# Patient Record
Sex: MALE | Race: WHITE | NOT HISPANIC OR LATINO | Employment: OTHER | URBAN - NONMETROPOLITAN AREA
[De-identification: names, ages, dates, MRNs, and addresses within clinical notes are randomized per-mention and may not be internally consistent; named-entity substitution may affect disease eponyms.]

---

## 2020-06-22 ENCOUNTER — HOSPITAL ENCOUNTER (EMERGENCY)
Facility: HOSPITAL | Age: 40
Discharge: HOME | End: 2020-06-22
Attending: EMERGENCY MEDICINE | Admitting: EMERGENCY MEDICINE
Payer: COMMERCIAL

## 2020-06-22 VITALS
BODY MASS INDEX: 23.64 KG/M2 | WEIGHT: 156 LBS | OXYGEN SATURATION: 98 % | DIASTOLIC BLOOD PRESSURE: 74 MMHG | TEMPERATURE: 98 F | HEIGHT: 68 IN | HEART RATE: 61 BPM | RESPIRATION RATE: 18 BRPM | SYSTOLIC BLOOD PRESSURE: 126 MMHG

## 2020-06-22 DIAGNOSIS — T15.92XA FOREIGN BODY, EYE, LEFT, INITIAL ENCOUNTER: Primary | ICD-10-CM

## 2020-06-22 PROCEDURE — 99283 EMERGENCY DEPT VISIT LOW MDM: CPT

## 2020-06-22 PROCEDURE — 99283 EMERGENCY DEPT VISIT LOW MDM: CPT | Performed by: EMERGENCY MEDICINE

## 2020-06-22 RX ORDER — TETRACAINE HYDROCHLORIDE 5 MG/ML
2 SOLUTION OPHTHALMIC ONCE
Status: COMPLETED | OUTPATIENT
Start: 2020-06-22 | End: 2020-06-22

## 2020-06-22 RX ORDER — ERYTHROMYCIN 5 MG/G
0.5 OINTMENT OPHTHALMIC ONCE
Status: COMPLETED | OUTPATIENT
Start: 2020-06-22 | End: 2020-06-22

## 2020-06-22 RX ADMIN — FLUORESCEIN SODIUM 1 STRIP: 1 STRIP OPHTHALMIC at 21:49

## 2020-06-22 RX ADMIN — TETRACAINE HYDROCHLORIDE 2 DROP: 5 SOLUTION OPHTHALMIC at 21:50

## 2020-06-22 RX ADMIN — ERYTHROMYCIN 0.5 INCH: 5 OINTMENT OPHTHALMIC at 22:23

## 2022-03-27 ENCOUNTER — HOSPITAL ENCOUNTER (EMERGENCY)
Facility: HOSPITAL | Age: 42
Discharge: HOME | End: 2022-03-27
Attending: EMERGENCY MEDICINE | Admitting: EMERGENCY MEDICINE
Payer: COMMERCIAL

## 2022-03-27 ENCOUNTER — APPOINTMENT (EMERGENCY)
Dept: RADIOLOGY | Facility: HOSPITAL | Age: 42
End: 2022-03-27
Payer: COMMERCIAL

## 2022-03-27 VITALS
DIASTOLIC BLOOD PRESSURE: 89 MMHG | RESPIRATION RATE: 20 BRPM | WEIGHT: 153 LBS | TEMPERATURE: 97.9 F | HEART RATE: 64 BPM | OXYGEN SATURATION: 98 % | HEIGHT: 69 IN | SYSTOLIC BLOOD PRESSURE: 142 MMHG | BODY MASS INDEX: 22.66 KG/M2

## 2022-03-27 DIAGNOSIS — S90.30XA CONTUSION OF FOOT: Primary | ICD-10-CM

## 2022-03-27 DIAGNOSIS — H92.09 EAR PAIN: ICD-10-CM

## 2022-03-27 PROCEDURE — 99284 EMERGENCY DEPT VISIT MOD MDM: CPT | Performed by: PHYSICIAN ASSISTANT

## 2022-03-27 PROCEDURE — 99283 EMERGENCY DEPT VISIT LOW MDM: CPT

## 2022-03-27 PROCEDURE — 73630 X-RAY EXAM OF FOOT: CPT

## 2022-03-27 RX ORDER — IBUPROFEN 400 MG/1
800 TABLET ORAL ONCE
Status: COMPLETED | OUTPATIENT
Start: 2022-03-27 | End: 2022-03-27

## 2022-03-27 RX ORDER — ACETAMINOPHEN 325 MG/1
650 TABLET ORAL ONCE
Status: COMPLETED | OUTPATIENT
Start: 2022-03-27 | End: 2022-03-27

## 2022-03-27 RX ORDER — OFLOXACIN 3 MG/ML
1 SOLUTION/ DROPS OPHTHALMIC ONCE
Status: COMPLETED | OUTPATIENT
Start: 2022-03-27 | End: 2022-03-27

## 2022-03-27 RX ADMIN — IBUPROFEN 800 MG: 400 TABLET ORAL at 20:34

## 2022-03-27 RX ADMIN — ACETAMINOPHEN 325MG 650 MG: 325 TABLET ORAL at 19:33

## 2022-03-27 RX ADMIN — OFLOXACIN 1 DROP: 3 SOLUTION/ DROPS OPHTHALMIC at 19:33

## 2022-03-27 NOTE — ED PROVIDER NOTES
History  Chief Complaint   Patient presents with    Foot Injury     Pt reports going off a diving board and fell hitting bottom of R foot and smacking R side of face off the water  Pt reports pain in both R foot and ear pain/headache  Denies any LOC/vomiting    Head Injury     42year old male presents emergency department for evaluation  Patient states prior to arrival he was at a swimming pool edge of min off a diving board  States when he jumped he hit his foot off the diving board and landed on his right side hitting the side of his face off the water  He states since he has pain in the right foot and right ear  States he did continue to swim for some time after this incident  Denies any LOC, N/V, HA, dizziness, confusion or visual changes  Denies any drainage from the ear  Denies cough or congestion  Reports pain in the right foot with ambulation but improves with rest        History provided by:  Patient  Foot Injury - Major  Location:  Foot  Foot location:  R foot  Pain details:     Quality:  Unable to specify    Radiates to:  Does not radiate    Severity:  Mild    Onset quality:  Sudden  Chronicity:  New  Relieved by:  Rest  Worsened by:  Bearing weight  Ineffective treatments:  None tried  Associated symptoms: no back pain, no decreased ROM, no fatigue, no fever, no itching, no muscle weakness, no neck pain, no numbness, no stiffness, no swelling and no tingling    Earache  Location:  Right  Behind ear:  No abnormality  Quality:  Aching  Severity:  Mild  Onset quality:  Sudden  Timing:  Constant  Progression:  Worsening  Chronicity:  New  Context: direct blow    Relieved by:  Nothing  Worsened by:  Nothing  Ineffective treatments:  None tried  Associated symptoms: no abdominal pain, no congestion, no cough, no diarrhea, no ear discharge, no fever, no headaches, no hearing loss, no neck pain, no rash, no rhinorrhea, no sore throat, no tinnitus and no vomiting        None       History reviewed   No pertinent past medical history  Past Surgical History:   Procedure Laterality Date    FACIAL FRACTURE SURGERY      FOOT SURGERY         History reviewed  No pertinent family history  I have reviewed and agree with the history as documented  E-Cigarette/Vaping     E-Cigarette/Vaping Substances     Social History     Tobacco Use    Smoking status: Never Smoker    Smokeless tobacco: Never Used   Substance Use Topics    Alcohol use: Never    Drug use: Never       Review of Systems   Constitutional: Negative  Negative for appetite change, chills, diaphoresis, fatigue and fever  HENT: Positive for ear pain  Negative for congestion, ear discharge, hearing loss, rhinorrhea, sore throat and tinnitus  Respiratory: Negative  Negative for cough  Cardiovascular: Negative  Gastrointestinal: Negative  Negative for abdominal pain, diarrhea and vomiting  Musculoskeletal: Positive for arthralgias  Negative for back pain, neck pain and stiffness  Skin: Negative  Negative for itching and rash  Neurological: Negative  Negative for headaches  All other systems reviewed and are negative  Physical Exam  Physical Exam  Vitals and nursing note reviewed  Constitutional:       General: He is not in acute distress  Appearance: Normal appearance  He is normal weight  He is not ill-appearing, toxic-appearing or diaphoretic  HENT:      Head: Normocephalic and atraumatic  No raccoon eyes, Burks's sign, abrasion, contusion or laceration  Right Ear: Hearing normal  Tympanic membrane is erythematous  Left Ear: Hearing, tympanic membrane, ear canal and external ear normal       Nose: Nose normal  No congestion or rhinorrhea  Mouth/Throat:      Mouth: Mucous membranes are moist    Eyes:      Extraocular Movements: Extraocular movements intact  Conjunctiva/sclera: Conjunctivae normal       Pupils: Pupils are equal, round, and reactive to light     Cardiovascular:      Pulses: Posterior tibial pulses are 2+ on the right side and 2+ on the left side  Pulmonary:      Effort: Pulmonary effort is normal    Musculoskeletal:         General: Tenderness (right foot) present  No deformity  Normal range of motion  Cervical back: Normal range of motion and neck supple  No tenderness  Right foot: Normal range of motion  Bony tenderness present  No swelling or deformity  Feet:    Skin:     General: Skin is warm and dry  Capillary Refill: Capillary refill takes less than 2 seconds  Findings: No bruising, erythema or rash  Neurological:      General: No focal deficit present  Mental Status: He is alert and oriented to person, place, and time     Psychiatric:         Mood and Affect: Mood normal          Behavior: Behavior normal          Vital Signs  ED Triage Vitals [03/27/22 1916]   Temperature Pulse Respirations Blood Pressure SpO2   97 9 °F (36 6 °C) 64 20 142/89 98 %      Temp Source Heart Rate Source Patient Position - Orthostatic VS BP Location FiO2 (%)   Temporal Monitor Sitting Right arm --      Pain Score       4           Vitals:    03/27/22 1916   BP: 142/89   Pulse: 64   Patient Position - Orthostatic VS: Sitting         Visual Acuity      ED Medications  Medications   ofloxacin (OCUFLOX) 0 3 % ophthalmic solution 1 drop (1 drop Otic Given 3/27/22 1933)   acetaminophen (TYLENOL) tablet 650 mg (650 mg Oral Given 3/27/22 1933)   ibuprofen (MOTRIN) tablet 800 mg (800 mg Oral Given 3/27/22 2034)       Diagnostic Studies  Results Reviewed     None                 XR foot 3+ views RIGHT   ED Interpretation by Ace Workman PA-C (03/27 2037)   No acute fracture                 Procedures  Procedures         ED Course         MDM  Number of Diagnoses or Management Options  Contusion of foot: new and requires workup  Ear pain: new and requires workup  Diagnosis management comments: 42year old male presents emergency department for evaluation of right foot and right ear pain status post diving into a swimming pool hitting the foot off of the diving board and hitting his ear off the water  Vitals in medical record reviewed  On exam patient had tenderness to the right metatarsals  No swelling, deformities or bruising  Normal pulses and sensation  Normal hearing bilaterally  Right TM was erythematous with no obvious rupture  Interpretation of foot x-ray negative for acute osseous injury  Patient was treated with Tylenol and Motrin  Started on ofloxacin drops  Will follow-up with ENT  Rice therapy was discussed and patient verbalized understanding  He agreed to this treatment plan  Remained clinically and hemodynamically stable in the ER and was discharged home       Amount and/or Complexity of Data Reviewed  Tests in the radiology section of CPT®: ordered and reviewed  Review and summarize past medical records: yes  Independent visualization of images, tracings, or specimens: yes        Disposition  Final diagnoses:   Contusion of foot   Ear pain     Time reflects when diagnosis was documented in both MDM as applicable and the Disposition within this note     Time User Action Codes Description Comment    3/27/2022  8:31 PM Latanya Evans Add [S90 30XA] Contusion of foot     3/27/2022  8:31 PM Latanya Evans Add [H92 09] Ear pain       ED Disposition     ED Disposition Condition Date/Time Comment    Discharge Stable Sun Mar 27, 2022  8:31 PM Lenn Avers discharge to home/self care  Follow-up Information     Follow up With Specialties Details [facility]   [address] [place] [address]      Gardenia Sever, MD Otolaryngology   [facility] [address] [facility]  [address]  [address] [facility]             There are no discharge medications for this patient            PDMP Review     None          ED Provider  Electronically Signed by           Lane Nina Amber Flowers PA-C  03/27/22 1189

## 2022-03-28 NOTE — DISCHARGE INSTRUCTIONS
Please return with new or worsening symptoms   Please follow up with PCP and ENT  Use ear drops 10 drop into the right ear 2 times a day for 5 days

## 2023-11-06 ENCOUNTER — OFFICE VISIT (OUTPATIENT)
Dept: URGENT CARE | Facility: CLINIC | Age: 43
End: 2023-11-06
Payer: COMMERCIAL

## 2023-11-06 VITALS
BODY MASS INDEX: 25.31 KG/M2 | RESPIRATION RATE: 16 BRPM | OXYGEN SATURATION: 98 % | HEART RATE: 57 BPM | HEIGHT: 68 IN | DIASTOLIC BLOOD PRESSURE: 84 MMHG | WEIGHT: 167 LBS | TEMPERATURE: 97.2 F | SYSTOLIC BLOOD PRESSURE: 125 MMHG

## 2023-11-06 DIAGNOSIS — H00.012 HORDEOLUM EXTERNUM OF RIGHT LOWER EYELID: Primary | ICD-10-CM

## 2023-11-06 PROCEDURE — 99213 OFFICE O/P EST LOW 20 MIN: CPT | Performed by: PHYSICIAN ASSISTANT

## 2023-11-06 RX ORDER — ERYTHROMYCIN 5 MG/G
0.5 OINTMENT OPHTHALMIC EVERY 6 HOURS SCHEDULED
Qty: 3.5 G | Refills: 0 | Status: SHIPPED | OUTPATIENT
Start: 2023-11-06 | End: 2023-11-13

## 2023-11-06 NOTE — PROGRESS NOTES
[facility]        NAME: Malissa Moran is a 39 y.o. male  : 1980    MRN: [id number]  DATE: 2023  TIME: 4:21 PM    Assessment and Plan   Hordeolum externum of right lower eyelid [H00.012]  1. Hordeolum externum of right lower eyelid  erythromycin (ILOTYCIN) ophthalmic ointment            Patient Instructions       Follow up with PCP in 3-5 days. Proceed to  ER if symptoms worsen. Chief Complaint     Chief Complaint   Patient presents with    Eye Problem     Right eye lid swollen and eye irration starting Friday; blurred vision and drainage from eye         History of Present Illness       Eye Problem   The right eye is affected. This is a new problem. Episode onset: 4 days ago. The problem occurs constantly. The problem has been gradually worsening. There was no injury mechanism. The pain is moderate. There is No known exposure to pink eye. He Does not wear contacts. Associated symptoms include an eye discharge, eye redness and itching. Pertinent negatives include no fever or photophobia. Associated symptoms comments: Bump to lower lid. Treatments tried: ciprofloxacin drops. The treatment provided no relief. Review of Systems   Review of Systems   Constitutional:  Negative for fatigue and fever. Eyes:  Positive for pain, discharge, redness and itching. Negative for photophobia and visual disturbance.          Current Medications       Current Outpatient Medications:     Cholecalciferol (VITAMIN D3 PO), Take by mouth, Disp: , Rfl:     CHROMIUM PICOLINATE PO, Take by mouth, Disp: , Rfl:     Cyanocobalamin (VITAMIN B12 PO), Take by mouth, Disp: , Rfl:     erythromycin (ILOTYCIN) ophthalmic ointment, Administer 0.5 inches to the right eye every 6 (six) hours for 7 days, Disp: 3.5 g, Rfl: 0    Multiple Vitamin (MULTIVITAMIN PO), Take by mouth, Disp: , Rfl:     NON FORMULARY, SGSmis plus, ADK 10,, Disp: , Rfl:     Current Allergies     Allergies as of 2023    (No Known Allergies)            The following portions of the patient's history were reviewed and updated as appropriate: allergies, current medications, past family history, past medical history, past social history, past surgical history and problem list.     History reviewed. No pertinent past medical history. Past Surgical History:   Procedure Laterality Date    FACIAL FRACTURE SURGERY      FOOT SURGERY      LASIK  2005    SHOULDER SURGERY         Family History   Problem Relation Age of Onset    Diabetes Father          Medications have been verified. Objective   /84   Pulse 57   Temp (!) 97.2 °F (36.2 °C)   Resp 16   Ht 5' 8" (1.727 m)   Wt 75.8 kg (167 lb)   SpO2 98%   BMI 25.39 kg/m²   No LMP for male patient. Physical Exam     Physical Exam  Constitutional:       Appearance: Normal appearance. Eyes:      General: Lids are normal. Lids are everted, no foreign bodies appreciated. Vision grossly intact. Gaze aligned appropriately. Right eye: Hordeolum (right lower eyelid) present. Extraocular Movements: Extraocular movements intact. Conjunctiva/sclera:      Right eye: Right conjunctiva is not injected. No chemosis, exudate or hemorrhage. Cardiovascular:      Rate and Rhythm: Normal rate and regular rhythm. Pulmonary:      Effort: Pulmonary effort is normal. No respiratory distress. Neurological:      Mental Status: He is alert.

## 2024-06-05 ENCOUNTER — OFFICE VISIT (OUTPATIENT)
Dept: URGENT CARE | Facility: CLINIC | Age: 44
End: 2024-06-05
Payer: COMMERCIAL

## 2024-06-05 VITALS
SYSTOLIC BLOOD PRESSURE: 122 MMHG | HEIGHT: 68 IN | TEMPERATURE: 97.1 F | DIASTOLIC BLOOD PRESSURE: 90 MMHG | RESPIRATION RATE: 16 BRPM | BODY MASS INDEX: 25.91 KG/M2 | OXYGEN SATURATION: 97 % | WEIGHT: 171 LBS | HEART RATE: 56 BPM

## 2024-06-05 DIAGNOSIS — J30.2 SEASONAL ALLERGIES: Primary | ICD-10-CM

## 2024-06-05 PROCEDURE — G0382 LEV 3 HOSP TYPE B ED VISIT: HCPCS

## 2024-06-05 PROCEDURE — S9083 URGENT CARE CENTER GLOBAL: HCPCS

## 2024-06-05 RX ORDER — FLUTICASONE PROPIONATE 50 MCG
1 SPRAY, SUSPENSION (ML) NASAL DAILY
Qty: 9.9 ML | Refills: 0 | Status: SHIPPED | OUTPATIENT
Start: 2024-06-05

## 2024-06-05 RX ORDER — LORATADINE 10 MG/1
10 TABLET ORAL DAILY
Qty: 30 TABLET | Refills: 0 | Status: SHIPPED | OUTPATIENT
Start: 2024-06-05 | End: 2024-07-05

## 2024-06-05 NOTE — PROGRESS NOTES
[facility]        NAME: Leoncio Hinds is a 44 y.o. male  : 1980    MRN: [id number]  DATE: 2024  TIME: 4:16 PM    Assessment and Plan   Seasonal allergies [J30.2]  1. Seasonal allergies  loratadine (CLARITIN) 10 mg tablet    fluticasone (FLONASE) 50 mcg/act nasal spray      Supportive care dicussed. Discussed return precautions and ED precautions.  Patient Instructions     Decongestants and antihistamines recommended for congestion. No signs of bacterial infection today. Follow up with PCP in 3-5 days if no improvement. Proceed to ER if symptoms worsen.    Chief Complaint     Chief Complaint   Patient presents with    Cold Like Symptoms     Congestion, sinus pressure, fatigue started about 5 days ago.      History of Present Illness     Leoncio Hinds is a 44 y.o. male presenting to the office today for upper respiratory complaints.   Symptoms have been present for 5 days, and include congestion, postnasal drip.   He has tried Zinc, Alkaseltzer for his symptoms, minimal relief.  Sick contacts include: none      Review of Systems     Review of Systems   Constitutional:  Negative for chills and fever.   HENT:  Positive for congestion, postnasal drip and sore throat (after postnasal drip in morning). Negative for ear pain and trouble swallowing.    Respiratory:  Positive for cough. Negative for shortness of breath, wheezing and stridor.    Gastrointestinal:  Negative for abdominal pain, nausea and vomiting.   Genitourinary: Negative.    Musculoskeletal: Negative.    Skin: Negative.    Neurological: Negative.        Current Medications       Current Outpatient Medications:     Cholecalciferol (VITAMIN D3 PO), Take by mouth, Disp: , Rfl:     CHROMIUM PICOLINATE PO, Take by mouth, Disp: , Rfl:     Cyanocobalamin (VITAMIN B12 PO), Take by mouth, Disp: , Rfl:     fluticasone (FLONASE) 50 mcg/act nasal spray, 1 spray into each nostril daily, Disp: 9.9 mL, Rfl: 0    loratadine (CLARITIN) 10 mg  "tablet, Take 1 tablet (10 mg total) by mouth daily, Disp: 30 tablet, Rfl: 0    Multiple Vitamin (MULTIVITAMIN PO), Take by mouth, Disp: , Rfl:     NON FORMULARY, SGSmis plus, ADK 10,, Disp: , Rfl:     Current Allergies     Allergies as of 06/05/2024    (No Known Allergies)            The following portions of the patient's history were reviewed and updated as appropriate: allergies, current medications, past family history, past medical history, past social history, past surgical history and problem list.     History reviewed. No pertinent past medical history.    Past Surgical History:   Procedure Laterality Date    FACIAL FRACTURE SURGERY      FOOT SURGERY      LASIK  2005    SHOULDER SURGERY         Family History   Problem Relation Age of Onset    Diabetes Father        Medications have been verified.    Objective     /90   Pulse 56   Temp (!) 97.1 °F (36.2 °C)   Resp 16   Ht 5' 8\" (1.727 m)   Wt 77.6 kg (171 lb)   SpO2 97%   BMI 26.00 kg/m²   No LMP for male patient.     Physical Exam     Physical Exam  Vitals and nursing note reviewed.   Constitutional:       General: He is not in acute distress.     Appearance: Normal appearance. He is well-developed and normal weight. He is not ill-appearing, toxic-appearing or diaphoretic.   HENT:      Head: Normocephalic and atraumatic.      Right Ear: Tympanic membrane, ear canal and external ear normal. No drainage, swelling or tenderness. No middle ear effusion. There is no impacted cerumen. Tympanic membrane is not erythematous.      Left Ear: Tympanic membrane, ear canal and external ear normal. No drainage, swelling or tenderness.  No middle ear effusion. There is no impacted cerumen. Tympanic membrane is not erythematous.      Nose: Congestion and rhinorrhea present.      Mouth/Throat:      Mouth: Mucous membranes are moist. No oral lesions.      Pharynx: Uvula midline. Posterior oropharyngeal erythema present. No pharyngeal swelling, oropharyngeal " exudate or uvula swelling.      Tonsils: No tonsillar exudate or tonsillar abscesses.   Eyes:      General: No scleral icterus.        Right eye: No discharge.         Left eye: No discharge.      Conjunctiva/sclera: Conjunctivae normal.   Neck:      Thyroid: No thyromegaly.   Cardiovascular:      Rate and Rhythm: Normal rate and regular rhythm.      Pulses: Normal pulses.      Heart sounds: Normal heart sounds. No murmur heard.     No friction rub. No gallop.   Pulmonary:      Effort: Pulmonary effort is normal. No respiratory distress.      Breath sounds: Normal breath sounds. No stridor. No wheezing, rhonchi or rales.   Chest:      Chest wall: No tenderness.   Musculoskeletal:         General: Normal range of motion.      Cervical back: Normal range of motion and neck supple.   Lymphadenopathy:      Cervical: No cervical adenopathy.   Skin:     General: Skin is warm and dry.      Capillary Refill: Capillary refill takes less than 2 seconds.   Neurological:      General: No focal deficit present.      Mental Status: He is alert and oriented to person, place, and time.   Psychiatric:         Mood and Affect: Mood normal.         Behavior: Behavior normal.

## 2024-06-05 NOTE — PATIENT INSTRUCTIONS
Allergies   AMBULATORY CARE:   Allergies  are an immune system reaction to a substance called an allergen. Your immune system sees the allergen as harmful and attacks it.   Common signs and symptoms include the following:   Mild symptoms  include sneezing and a runny, itchy, or stuffy nose. You may also have swollen, watery, or itchy eyes, or skin itching. You may have swelling or pain where an insect bit or stung you.    Anaphylaxis symptoms  include trouble breathing or swallowing, a rash or hives, or severe swelling. You may also have a cough, wheezing, or feel lightheaded or dizzy. Anaphylaxis is a sudden, life-threatening reaction that needs immediate treatment.    Call 911 for signs or symptoms of anaphylaxis,  such as trouble breathing, swelling in your mouth or throat, or wheezing. You may also have itching, a rash, hives, or feel like you are going to faint.  Seek care immediately if:   You have tingling in your hands or feet.     Your skin is red or flushed.    Contact your healthcare provider if:   You have questions or concerns about your condition or care.      Steps to take for signs or symptoms of anaphylaxis:   Immediately  give 1 shot of epinephrine only into the outer thigh muscle.     Leave the shot in place  as directed. Your healthcare provider may recommend you leave it in place for up to 10 seconds before you remove it. This helps make sure all of the epinephrine is delivered.     Call 911 and go to the emergency department,  even if the shot improved symptoms. Do not drive yourself. Bring the used epinephrine shot with you.    Treatment for allergies  may include any of the following:  Antihistamines  help decrease itching, sneezing, and swelling. You may take them as a pill or use drops in your nose or eyes.     Decongestants  help your nose feel less stuffy.     Steroids  decrease swelling and redness.     Topical treatments  help decrease itching or swelling. You also may be given nasal  sprays or eyedrops.     Epinephrine  is medicine used to treat severe allergic reactions such as anaphylaxis.     Desensitization  gets your body used to allergens you cannot avoid. Your healthcare provider will give you a shot that contains a small amount of an allergen. Any allergic reaction you have will be treated. Your provider will give you more of the allergen a little at a time until your body gets used to it. Your reaction to the allergen may be less serious after this treatment. Your provider will tell you how long to get the shots.    Safety precautions to take if you are at risk for anaphylaxis:   Keep 2 shots of epinephrine with you at all times.  You may need a second shot, because epinephrine only works for about 20 minutes and symptoms may return. Your healthcare provider can show you and family members how to give the shot. Check the expiration date every month and replace it before it expires.    Create an action plan.  Your healthcare provider can help you create a written plan that explains the allergy and an emergency plan to treat a reaction. The plan explains when to give a second epinephrine shot if symptoms return or do not improve after the first. Give copies of the action plan and emergency instructions to family members and work staff. Show them how to give a shot of epinephrine.    Be careful when you exercise.  If you have had exercise-induced anaphylaxis, do not exercise right after you eat. Stop exercising right away if you start to develop any signs or symptoms of anaphylaxis. You may first feel tired, warm, or have itchy skin. Hives, swelling, and severe breathing problems may develop if you continue to exercise.    Carry medical alert identification.  Wear medical alert jewelry or carry a card that explains the allergy. Ask your healthcare provider where to get these items.         Inform all healthcare providers of the allergy.  This includes dentists, nurses, doctors, and  surgeons.    Manage allergies:   Use nasal rinses as directed.  Rinse with a saline solution daily. This will help clear allergens out of your nose. Use distilled water if possible. You can also boil tap water and let it cool before you use it. Do not use tap water that has not been boiled.    Do not smoke.  Allergy symptoms may decrease if you are not around smoke. Nicotine and other chemicals in cigarettes and cigars can cause lung damage. Ask your healthcare provider for information if you currently smoke and need help to quit. E-cigarettes or smokeless tobacco still contain nicotine. Talk to your healthcare provider before you use these products.    Prevent an allergic reaction:   Do not go outside when pollen counts are high if you have seasonal allergies.  Your symptoms may be better if you go outside only in the morning or evening. Use your air conditioner, and change air filters often.     Avoid dust, fur, and mold.  Dust and vacuum your home often. You may want to wear a mask when you vacuum. Keep pets in certain rooms, and bathe them often. Use a dehumidifier (machine that decreases moisture) to help prevent mold.     Do not use products that contain latex if you have a latex allergy.  Use nonlatex gloves if you work in healthcare or in food preparation. Always tell healthcare providers about a latex allergy.     Avoid areas that attract insects if you have an insect bite or sting allergy.  Areas include trash cans, gardens, and picnics. Do not wear bright clothing or strong scents when you will be outside.    Prevent an allergic reaction caused by food.  You may have a reaction if your food is not prepared safely. For example, you could be served food that touched your trigger food during preparation. This is called cross-contamination. Kitchen tools can also cause cross-contamination. You may also eat baked foods that contain a trigger food you do not know about. Ask if the food contains your trigger  food before you handle or eat it.    Follow up with your healthcare provider as directed:  Write down your questions so you remember to ask them during your visits. When you have an allergic reaction, write down everything you were exposed to in the 2 hours before the reaction. Take that information to your next visit.  © Copyright [employer] 2023 Information is for End User's use only and may not be sold, redistributed or otherwise used for commercial purposes.  The above information is an  only. It is not intended as medical advice for individual conditions or treatments. Talk to your doctor, nurse or pharmacist before following any medical regimen to see if it is safe and effective for you.

## 2024-10-23 ENCOUNTER — HOSPITAL ENCOUNTER (EMERGENCY)
Facility: HOSPITAL | Age: 44
Discharge: HOME | End: 2024-10-23
Attending: EMERGENCY MEDICINE
Payer: COMMERCIAL

## 2024-10-23 ENCOUNTER — TELEPHONE (OUTPATIENT)
Age: 44
End: 2024-10-23

## 2024-10-23 ENCOUNTER — APPOINTMENT (EMERGENCY)
Dept: RADIOLOGY | Facility: HOSPITAL | Age: 44
End: 2024-10-23
Payer: COMMERCIAL

## 2024-10-23 VITALS
DIASTOLIC BLOOD PRESSURE: 86 MMHG | RESPIRATION RATE: 18 BRPM | HEART RATE: 64 BPM | OXYGEN SATURATION: 100 % | TEMPERATURE: 98.1 F | SYSTOLIC BLOOD PRESSURE: 133 MMHG

## 2024-10-23 DIAGNOSIS — S92.422A: Primary | ICD-10-CM

## 2024-10-23 PROCEDURE — 73630 X-RAY EXAM OF FOOT: CPT

## 2024-10-23 PROCEDURE — 99284 EMERGENCY DEPT VISIT MOD MDM: CPT | Performed by: EMERGENCY MEDICINE

## 2024-10-23 PROCEDURE — 99283 EMERGENCY DEPT VISIT LOW MDM: CPT

## 2024-10-23 RX ORDER — KETOROLAC TROMETHAMINE 30 MG/ML
15 INJECTION, SOLUTION INTRAMUSCULAR; INTRAVENOUS ONCE
Status: DISCONTINUED | OUTPATIENT
Start: 2024-10-23 | End: 2024-10-23 | Stop reason: HOSPADM

## 2024-10-23 RX ORDER — NAPROXEN 500 MG/1
500 TABLET ORAL 2 TIMES DAILY PRN
Qty: 30 TABLET | Refills: 0 | Status: SHIPPED | OUTPATIENT
Start: 2024-10-23

## 2024-10-23 NOTE — Clinical Note
Leoncio Hinds was seen and treated in our emergency department on 10/23/2024.        No work until cleared by Family Doctor/Orthopedics        Diagnosis:     Leoncio  .    He may return on this date:          If you have any questions or concerns, please don't hesitate to call.      Nadeem Hooks, DO    ______________________________           _______________          _______________  Hospital Representative                              Date                                Time

## 2024-10-23 NOTE — TELEPHONE ENCOUNTER
Called and spoke with patient. Patient is scheduled 10/29 with Dr. Fernández in office due to their work schedule. Dr. Fernández had sooner openings.

## 2024-10-23 NOTE — TELEPHONE ENCOUNTER
Caller: Leoncio Hinds    Doctor and/or Office: Dr. Matute/Roque    #: [phone]    Escalation: AppointmentPatient was seen in the ED with a fracture of distal phalanx of left great toe. How soon should he be seen? Please return call and schedule an appt in [place]. Thank you

## 2024-10-23 NOTE — DISCHARGE INSTRUCTIONS
Use pain medications as directed.  We recommend ice 4-6 times a day for 15 to 20 minutes at a time to the affected area.  Try and leave the area elevated to help reduce swelling and pain.  Return with any worsening, particularly increased pain, severe swelling, or any other symptomatology that seems concerning.    Use surgical boot and crutches until seen in follow-up with podiatry.  Return with any worsening.    Your imaging studies have been preliminarily reviewed by the emergency department.  Further review by Radiology is pending at this time.  If there is a discrepancy or a finding of additional concern identified, we will attempt to contact you at the number you have provided us.  If you do not hear from us, follow-up with your primary care provider within 1-2 weeks is always recommended to ensure that all findings were normal or as initially reported.  Your results may also be available on [email or web address] [email or web address]    Please also note that sometimes there are subtle abnormalities in your lab values that you may observe when you access your record online.  These are frequently not worrisome and if they are of concern we will have discussed them with you.  However, we always encourage that you discuss any concerns you may have or observe on your record with your primary care provider.  Please also note that while your visit documentation was reviewed prior to completion, voice transcription will occasionally recognize words or grammar differently than what was spoken.

## 2024-10-23 NOTE — ED PROVIDER NOTES
Time reflects when diagnosis was documented in both MDM as applicable and the Disposition within this note       Time User Action Codes Description Comment    10/23/2024  2:46 PM Nadeem Hooks Add [S92.422A] Fracture of distal phalanx of left great toe           ED Disposition       ED Disposition   Discharge    Condition   Stable    Date/Time   Wed Oct 23, 2024  2:46 PM    Comment   Leoncio Hinds discharge to home/self care.                   Assessment & Plan       Medical Decision Making  Patient presented to the emergency department and a MSE was performed. The patient was evaluated for complaint related to acute crush injury to left foot.  Patient is potentially at risk for, but not limited to, strain, sprain, fracture, dislocation or ligamentous disruption.  Several of these diagnoses have been evaluated and ruled out by history and physical.  As needed, patient will be further evaluated with laboratory and imaging studies.  Higher level diagnostics, such as CT imaging or ultrasound, may also be required.  Please see work-up portion of the note for further evaluation of patient's risk.  Socioeconomic factors were also considered as part of the decision-making process.  Unless otherwise stated in the chart or patient is admitted as elsewhere documented, any previously prescribed medications will be maintained.    Problems Addressed:  Fracture of distal phalanx of left great toe: acute illness or injury    Amount and/or Complexity of Data Reviewed  Radiology: ordered and independent interpretation performed. Decision-making details documented in ED Course.    Risk  Prescription drug management.        ED Course as of 10/23/24 1448   Wed Oct 23, 2024   1445 Fracture left great toe.  Surgical walking boot and crutches.  Follow-up with podiatry.       Medications   ketorolac (TORADOL) injection 15 mg (has no administration in time range)       ED Risk Strat Scores                                                History of Present Illness       Chief Complaint   Patient presents with    Foot Pain     Dropped concrete plate on foot at work       History reviewed. No pertinent past medical history.   Past Surgical History:   Procedure Laterality Date    FACIAL FRACTURE SURGERY      FOOT SURGERY      LASIK  2005    SHOULDER SURGERY        Family History   Problem Relation Age of Onset    Diabetes Father       Social History     Tobacco Use    Smoking status: Never    Smokeless tobacco: Never   Vaping Use    Vaping status: Never Used   Substance Use Topics    Alcohol use: Not Currently    Drug use: Never      E-Cigarette/Vaping    E-Cigarette Use Never User       E-Cigarette/Vaping Substances      I have reviewed and agree with the history as documented.     44-year-old male to the emergency room with chief complaint of severe left great toe pain status post dropping a heavy concrete block on his foot earlier today.  Patient denies other injury.      History provided by:  Patient      Review of Systems   Musculoskeletal:  Positive for arthralgias and gait problem. Negative for joint swelling and myalgias.           Objective       ED Triage Vitals [10/23/24 1358]   Temperature Pulse Blood Pressure Respirations SpO2 Patient Position - Orthostatic VS   98.1 °F (36.7 °C) 64 133/86 18 100 % --      Temp Source Heart Rate Source BP Location FiO2 (%) Pain Score    Temporal Monitor Left arm -- 6      Vitals      Date and Time Temp Pulse SpO2 Resp BP Pain Score FACES Pain Rating User   10/23/24 1358 98.1 °F (36.7 °C) 64 100 % 18 133/86 6 -- SS            Physical Exam  Vitals and nursing note reviewed.   Constitutional:       General: He is not in acute distress.     Appearance: He is normal weight. He is not ill-appearing or toxic-appearing.   HENT:      Head: Normocephalic and atraumatic.      Nose: Nose normal.      Mouth/Throat:      Mouth: Mucous membranes are moist.   Pulmonary:      Effort: No respiratory distress.    Musculoskeletal:         General: Tenderness and signs of injury present. No swelling or deformity.        Feet:    Skin:     Coloration: Skin is not pale.   Neurological:      Mental Status: He is alert.   Psychiatric:         Mood and Affect: Mood normal.         Results Reviewed       None            XR foot 3+ views LEFT   ED Interpretation by Nadeem Hooks DO (10/23 1445)   Comminuted fracture distal phalanx left great toe          Procedures    ED Medication and Procedure Management   Prior to Admission Medications   Prescriptions Last Dose Informant Patient Reported? Taking?   CHROMIUM PICOLINATE PO   Yes No   Sig: Take by mouth   Cholecalciferol (VITAMIN D3 PO)   Yes No   Sig: Take by mouth   Cyanocobalamin (VITAMIN B12 PO)   Yes No   Sig: Take by mouth   Multiple Vitamin (MULTIVITAMIN PO)   Yes No   Sig: Take by mouth   NON FORMULARY   Yes No   Sig: SGSmis plus, ADK 10,   fluticasone (FLONASE) 50 mcg/act nasal spray   No No   Si spray into each nostril daily   loratadine (CLARITIN) 10 mg tablet   No No   Sig: Take 1 tablet (10 mg total) by mouth daily      Facility-Administered Medications: None     Patient's Medications   Discharge Prescriptions    NAPROXEN (NAPROSYN) 500 MG TABLET    Take 1 tablet (500 mg total) by mouth 2 (two) times a day as needed for mild pain or moderate pain       Start Date: 10/23/2024End Date: --       Order Dose: 500 mg       Quantity: 30 tablet    Refills: 0       ED SEPSIS DOCUMENTATION   Time reflects when diagnosis was documented in both MDM as applicable and the Disposition within this note       Time User Action Codes Description Comment    10/23/2024  2:46 PM Nadeem Hooks Add [S92.422A] Fracture of distal phalanx of left great toe                  Nadeem Hooks DO  10/23/24 1449

## 2024-10-25 RX ORDER — POLYETHYLENE GLYCOL-3350 AND ELECTROLYTES WITH FLAVOR PACK 240; 5.84; 2.98; 6.72; 22.72 G/278.26G; G/278.26G; G/278.26G; G/278.26G; G/278.26G
POWDER, FOR SOLUTION ORAL
COMMUNITY
Start: 2024-08-07

## 2024-10-25 RX ORDER — SYRINGE WITH NEEDLE, 1 ML 25GX5/8"
SYRINGE, EMPTY DISPOSABLE MISCELLANEOUS
COMMUNITY
Start: 2024-08-02

## 2024-10-25 RX ORDER — ERGOCALCIFEROL 1.25 MG/1
CAPSULE, LIQUID FILLED ORAL
COMMUNITY
Start: 2024-08-02

## 2024-10-25 RX ORDER — TADALAFIL 10 MG/1
TABLET ORAL
COMMUNITY
Start: 2024-10-14

## 2024-10-25 RX ORDER — SYRINGE, DISPOSABLE, 1 ML
SYRINGE, EMPTY DISPOSABLE MISCELLANEOUS
COMMUNITY
Start: 2024-10-14

## 2024-10-25 RX ORDER — TESTOSTERONE CYPIONATE 200 MG/ML
INJECTION, SOLUTION INTRAMUSCULAR
COMMUNITY
Start: 2024-10-18

## 2024-10-25 RX ORDER — ANASTROZOLE 1 MG/1
TABLET ORAL
COMMUNITY
Start: 2024-10-14

## 2024-10-29 ENCOUNTER — OFFICE VISIT (OUTPATIENT)
Dept: OBGYN CLINIC | Facility: CLINIC | Age: 44
End: 2024-10-29
Payer: COMMERCIAL

## 2024-10-29 VITALS
BODY MASS INDEX: 25.46 KG/M2 | OXYGEN SATURATION: 99 % | HEART RATE: 67 BPM | TEMPERATURE: 97.7 F | DIASTOLIC BLOOD PRESSURE: 78 MMHG | SYSTOLIC BLOOD PRESSURE: 130 MMHG | HEIGHT: 68 IN | WEIGHT: 168 LBS

## 2024-10-29 DIAGNOSIS — M79.672 LEFT FOOT PAIN: ICD-10-CM

## 2024-10-29 DIAGNOSIS — S92.422A CLOSED DISPLACED FRACTURE OF DISTAL PHALANX OF LEFT GREAT TOE, INITIAL ENCOUNTER: Primary | ICD-10-CM

## 2024-10-29 PROCEDURE — 28490 TREAT BIG TOE FRACTURE: CPT | Performed by: STUDENT IN AN ORGANIZED HEALTH CARE EDUCATION/TRAINING PROGRAM

## 2024-10-29 PROCEDURE — 99203 OFFICE O/P NEW LOW 30 MIN: CPT | Performed by: STUDENT IN AN ORGANIZED HEALTH CARE EDUCATION/TRAINING PROGRAM

## 2024-10-29 NOTE — PROGRESS NOTES
"1. Closed displaced fracture of distal phalanx of left great toe, initial encounter  Ambulatory referral to Podiatry    Fracture / Dislocation Treatment      2. Left foot pain  Fracture / Dislocation Treatment        Orders Placed This Encounter   Procedures   • Fracture / Dislocation Treatment        Imaging Studies (I personally reviewed images in PACS and report):    X-ray left foot 10/23/2024: Intra-articular minimally displaced fracture of the lateral base of the first distal phalanx.  There is also a comminuted mildly displaced first distal tuft fracture.  Otherwise no other degenerative changes and unremarkable soft tissues.    IMPRESSION:  Acute left great toe pain/injury/fracture secondary to dropping concrete plate left foot at work  Intra-articular minimally displaced fracture distal phalanx concurrent with tuft fracture  Currently symptoms improving with use of low tide Cam boot  Date of Injury: 10/23/2024        PLAN:  Repeat X-ray next visit:   Left great toe  Clinical exam and radiographic imaging reviewed with patient today, with impression as per above. I have discussed with the patient the pathophysiology of this diagnosis and reviewed how the examination correlates with this diagnosis.    Prior imaging reviewed as per above  Recommend conservative treatment at this time.  Continue weightbearing as tolerated in low tide Cam boot.  I also supplemented him with jorge taping and counseled use during the day with cotton between the toes to prevent dehiscence.  Counseled that this fracture can take at least 4 to 6 weeks to heal.  Regards to pain control counseled use of acetaminophen, NSAIDs, elevation affected extremity, avoiding weightbearing outside of the cam boot.  Declined need for work note    Return in about 4 weeks (around 11/26/2024).    Portions of the record may have been created with voice recognition software. Occasional wrong word or \"sound a like\" substitutions may have occurred due to " "the inherent limitations of voice recognition software. Read the chart carefully and recognize, using context, where substitutions have occurred.     CHIEF COMPLAINT:  Chief Complaint   Patient presents with   • Left Foot - Fracture         HPI:  Leoncio Hinds is a 44 y.o. male  who presents for       Visit 10/29/2024:  Initial evaluation of left great toe pain/injury/fracture  Precipitating injury on 10/23/2024 after dropping a concrete slab.  Patient notes he was wearing regular shoes and not steel toe shoes.  Reports immediate pain, swelling and bruising of his big toe and pain while weightbearing  Went to the ER the same day and had imaging done as noted above  Patient states that he has been wearing his friend's low tide Cam boot and feels that has helped improve his symptoms.  He states there is not as much pain with weightbearing.  Pain is localized to the great toe is nonradiating.  Reports of tingling sensations of the toe but denies a sense of coldness.  Denies use of pain medications for this issue as the pain is minimal/tolerable, and intermittent.  Denies prior surgeries of his left foot in the past.        Medical, Surgical, Family, and Social History    History reviewed. No pertinent past medical history.  Past Surgical History:   Procedure Laterality Date   • FACIAL FRACTURE SURGERY     • FOOT SURGERY     • LASIK  2005   • SHOULDER SURGERY       Social History   Social History     Substance and Sexual Activity   Alcohol Use Not Currently     Social History     Substance and Sexual Activity   Drug Use Never     Social History     Tobacco Use   Smoking Status Never   Smokeless Tobacco Never     Family History   Problem Relation Age of Onset   • Diabetes Father      No Known Allergies       Physical Exam  /78   Pulse 67   Temp 97.7 °F (36.5 °C) (Temporal)   Ht 5' 8\" (1.727 m)   Wt 76.2 kg (168 lb)   SpO2 99%   BMI 25.54 kg/m²     Constitutional:  see vital signs  Gen: well-developed, " normocephalic/atraumatic, well-groomed  Eyes: No inflammation or discharge of conjunctiva or lids; sclera clear   Pharynx: no inflammation, lesion, or mass of lips  Pulmonary/Chest: Effort normal. No respiratory distress.     Ortho Exam  Left foot exam:  Patient seen weightbearing in low tide Cam boot.  Removed for examination  Inspection: Minimal edema, ecchymosis of the great toe.  Otherwise no open wounds, erythema  Palpation: Skin is warm and dry.  Positive tenderness along the distal phalanx of the great toe but otherwise nontender throughout his great toe, lesser toes, metatarsals.  Nontender over the medial/lateral malleoli.  ROM: Limited but intact ROM of the distal phalanx with pain along the distal phalanx.  Intact ROM of the lesser toes.  Full ROM of the ankle  Sensation intact of his great toe    Fracture / Dislocation Treatment    Date/Time: 10/29/2024 8:00 AM    Performed by: Tariq Fernández MD  Authorized by: Tariq Fernández MD    Patient Location:  Clinic  [place] Protocol:  procedure performed by consultantConsent: Verbal consent obtained.  Risks and benefits: risks, benefits and alternatives were discussed  Consent given by: patient  Radiology Images displayed and confirmed. If images not available, report reviewed: imaging studies available    Injury location:  Toe  Location details:  Left great toe  Injury type:  Fracture  Fracture type: distal phalanx    Distal perfusion: normal    Neurological function: diminished    Neurological function comment:  Reports tingling sensation of great toe today  Range of motion: reduced    Immobilization:  Other (comment) (Recommend continue weightbearing as tolerated low tide Cam boot on left lower extremity.  I also supplemented and recommended jorge taping of his great toe while weightbearing)  Patient tolerance:  Patient tolerated the procedure well with no immediate complications